# Patient Record
Sex: MALE | Race: BLACK OR AFRICAN AMERICAN | NOT HISPANIC OR LATINO | URBAN - METROPOLITAN AREA
[De-identification: names, ages, dates, MRNs, and addresses within clinical notes are randomized per-mention and may not be internally consistent; named-entity substitution may affect disease eponyms.]

---

## 2019-07-31 ENCOUNTER — EMERGENCY (EMERGENCY)
Facility: HOSPITAL | Age: 54
LOS: 1 days | Discharge: HOME | End: 2019-07-31
Admitting: EMERGENCY MEDICINE
Payer: COMMERCIAL

## 2019-07-31 VITALS
HEART RATE: 97 BPM | RESPIRATION RATE: 17 BRPM | DIASTOLIC BLOOD PRESSURE: 79 MMHG | OXYGEN SATURATION: 98 % | SYSTOLIC BLOOD PRESSURE: 138 MMHG

## 2019-07-31 VITALS
HEART RATE: 114 BPM | WEIGHT: 139.99 LBS | DIASTOLIC BLOOD PRESSURE: 95 MMHG | TEMPERATURE: 98 F | RESPIRATION RATE: 18 BRPM | OXYGEN SATURATION: 97 % | SYSTOLIC BLOOD PRESSURE: 144 MMHG

## 2019-07-31 PROCEDURE — 99282 EMERGENCY DEPT VISIT SF MDM: CPT

## 2019-07-31 NOTE — ED ADULT TRIAGE NOTE - CHIEF COMPLAINT QUOTE
accompanied by [place]- PO # [id number] and PO # [id number]- is here for medical clearance but presents with no medical complaints- requesting water in triage

## 2019-07-31 NOTE — ED ADULT NURSE NOTE - CHIEF COMPLAINT QUOTE
accompanied by [place]- PO # [id number] and PO # [id number]- is here for medical clearance but presents with no medical complaints- requesting water in triage

## 2019-07-31 NOTE — ED PROVIDER NOTE - OBJECTIVE STATEMENT
54 yo male with hx "mood swings" on congentin under [facility] custody appears to have low literacy, presents for medical clearance. patient able to tell me his name, he is able to tell me he is in a hospital, unsure of date. patient asking for water as he is thirsty. has no medical complaints. denies illicit drug use.

## 2019-07-31 NOTE — ED ADULT TRIAGE NOTE - WEIGHT IN LBS
Please call pt to let him know he has an unread message in [address] Just wanted to send you a courtesy message to let you know that you are currently not receiving notifications when a [employer] message is sent. Allan Vazquez you either need to change your notifications settings to check the box that will allow you to receive a notification in the future when a message or lab result is sent to you through [employer] or you need to ensure you have an e-mail address saved that will allow you to receive a notification when a [employer] message has been sent to you.  Please check on both of these to make sure they are correct.  Thanks. After I saw you in December I asked you to taper your testosterone for one month by applying 3 pumps 4x/week and 2 pumps 3x/week. Avonne Ori decrease to 2 pumps everyday for the next month and then repeat your labs.  Do your labs reflect being on 2 pumps/day or some other dose as your testosterone level is still high at 949 so I will need to decrease your dose from whatever you are currently taking?  Let me know when you have a chance. 139.9

## 2019-07-31 NOTE — ED PROVIDER NOTE - CLINICAL SUMMARY MEDICAL DECISION MAKING FREE TEXT BOX
unremarkable medical screening examination, tolerating PO, mildly tachycardic, appears to be anxious due to arrest, will give water/food, repeat vitals

## 2019-08-04 DIAGNOSIS — Z00.8 ENCOUNTER FOR OTHER GENERAL EXAMINATION: ICD-10-CM

## 2019-08-04 DIAGNOSIS — R00.0 TACHYCARDIA, UNSPECIFIED: ICD-10-CM

## 2023-04-09 NOTE — ED PROVIDER NOTE - PHYSICAL EXAMINATION
CONSTITUTIONAL: Well-developed; well-nourished; in no acute distress.  	SKIN: Skin is warm and dry, no acute rash.  	HEAD: Normocephalic; atraumatic.  	EYES: clear bilaterally  	ENT: airway patent  	NECK: Supple; non tender.  	CARD: S1, S2 normal; no murmurs, gallops, or rubs. Regular rate and rhythm.  	RESP: No wheezes, rales or rhonchi.  	ABD: soft; non-distended; non-tender  	EXT: Normal ROM. No clubbing, cyanosis or edema.  	NEURO: Alert, oriented. Grossly unremarkable.  PSYCH: Cooperative, appropriate.
no
Transportation service